# Patient Record
Sex: FEMALE | Race: WHITE | NOT HISPANIC OR LATINO | Employment: FULL TIME | URBAN - METROPOLITAN AREA
[De-identification: names, ages, dates, MRNs, and addresses within clinical notes are randomized per-mention and may not be internally consistent; named-entity substitution may affect disease eponyms.]

---

## 2017-02-06 ENCOUNTER — OFFICE VISIT (OUTPATIENT)
Dept: URGENT CARE | Facility: URGENT CARE | Age: 22
End: 2017-02-06
Payer: COMMERCIAL

## 2017-02-06 VITALS
HEIGHT: 64 IN | BODY MASS INDEX: 19.63 KG/M2 | WEIGHT: 115 LBS | HEART RATE: 84 BPM | OXYGEN SATURATION: 99 % | SYSTOLIC BLOOD PRESSURE: 143 MMHG | DIASTOLIC BLOOD PRESSURE: 88 MMHG | TEMPERATURE: 98 F

## 2017-02-06 DIAGNOSIS — J45.21 MILD INTERMITTENT ASTHMA WITH EXACERBATION: Primary | ICD-10-CM

## 2017-02-06 PROCEDURE — 99214 OFFICE O/P EST MOD 30 MIN: CPT | Mod: 25 | Performed by: INTERNAL MEDICINE

## 2017-02-06 PROCEDURE — 94640 AIRWAY INHALATION TREATMENT: CPT | Performed by: INTERNAL MEDICINE

## 2017-02-06 RX ORDER — ALBUTEROL SULFATE 0.83 MG/ML
1 SOLUTION RESPIRATORY (INHALATION) ONCE
Qty: 1 VIAL | Refills: 0
Start: 2017-02-06 | End: 2017-02-06

## 2017-02-06 RX ORDER — PREDNISONE 20 MG/1
40 TABLET ORAL DAILY
Qty: 10 TABLET | Refills: 0 | Status: SHIPPED | OUTPATIENT
Start: 2017-02-06 | End: 2017-02-11

## 2017-02-06 NOTE — MR AVS SNAPSHOT
"              After Visit Summary   2/6/2017    Myah Blumberg    MRN: [id number]           Patient Information     Date Of Birth          1995        Visit Information        Provider Department      2/6/2017 7:45 PM Charisse Nettles MD [facility]        Today's Diagnoses     Mild intermittent asthma with exacerbation    -  1       Care Instructions    Alb inhaler 2 puffs every 4 hours as needed  Prednisone 5 day burst.    Fluids  Rest    Recheck 1 week for lung recheck    Call or return to clinic if symptoms worsen or fail to improve as anticipated.          Follow-ups after your visit        Who to contact     If you have questions or need follow up information about today's clinic visit or your schedule please contact [facility] directly at [phone].  Normal or non-critical lab and imaging results will be communicated to you by MyChart, letter or phone within 4 business days after the clinic has received the results. If you do not hear from us within 7 days, please contact the clinic through MyChart or phone. If you have a critical or abnormal lab result, we will notify you by phone as soon as possible.  Submit refill requests through [employer] or call your pharmacy and they will forward the refill request to us. Please allow 3 business days for your refill to be completed.          Additional Information About Your Visit        MyChart Information     [employer] lets you send messages to your doctor, view your test results, renew your prescriptions, schedule appointments and more. To sign up, go to [email or web address] . Click on \"Log in\" on the left side of the screen, which will take you to the Welcome page. Then click on \"Sign up Now\" on the right side of the page.     You will be asked to enter the access code listed below, as well as some personal information. Please follow the directions to create your username and password.     Your access code " "is: [email or web address]  Expires: 2017  9:03 PM     Your access code will  in 90 days. If you need help or a new code, please call your [place] clinic or [phone].        Care EveryWhere ID     This is your Care EveryWhere ID. This could be used by other organizations to access your [place] medical records  [id number]        Your Vitals Were     Pulse Temperature Height BMI (Body Mass Index) Pulse Oximetry Last Period    84 98  F (36.7  C) (Tympanic) 5' 4\" (1.626 m) 19.73 kg/m2 99% 2017       Blood Pressure from Last 3 Encounters:   17 143/88   16 124/80   10/21/16 125/94    Weight from Last 3 Encounters:   17 115 lb (52.164 kg)   16 117 lb 9.6 oz (53.343 kg) (29.23 %*)   10/21/16 115 lb (52.164 kg) (24.18 %*)     * Growth percentiles are based on [facility] 2-20 Years data.              We Performed the Following     INHALATION/NEBULIZER TREATMENT, INITIAL          Today's Medication Changes          These changes are accurate as of: 17  9:03 PM.  If you have any questions, ask your nurse or doctor.               Start taking these medicines.        Dose/Directions    predniSONE 20 MG tablet   Commonly known as:  DELTASONE   Used for:  Mild intermittent asthma with exacerbation   Started by:  Charisse Nettles MD        Dose:  40 mg   Take 2 tablets (40 mg) by mouth daily for 5 days   Quantity:  10 tablet   Refills:  0         These medicines have changed or have updated prescriptions.        Dose/Directions    * albuterol (2.5 MG/3ML) 0.083% neb solution   This may have changed:  Another medication with the same name was added. Make sure you understand how and when to take each.   Changed by:  Portia Ozuna APRN CNP        Dose:  1 vial   Take 1 vial by nebulization every 6 hours as needed for shortness of breath / dyspnea or wheezing   Refills:  0       * albuterol (2.5 MG/3ML) 0.083% neb solution   This may have changed:  You were already taking a medication " with the same name, and this prescription was added. Make sure you understand how and when to take each.   Used for:  Mild intermittent asthma with exacerbation   Changed by:  Charisse Nettles MD        Dose:  1 vial   Take 1 vial (2.5 mg) by nebulization once for 1 dose   Quantity:  1 vial   Refills:  0       * Notice:  This list has 2 medication(s) that are the same as other medications prescribed for you. Read the directions carefully, and ask your doctor or other care provider to review them with you.         Where to get your medicines      Some of these will need a paper prescription and others can be bought over the counter.  Ask your nurse if you have questions.     Bring a paper prescription for each of these medications    - predniSONE 20 MG tablet    You don't need a prescription for these medications    - albuterol (2.5 MG/3ML) 0.083% neb solution             Primary Care Provider Office Phone # Fax #    [facility] [phone]       [address] [place] [id number]        Thank you!     Thank you for choosing [facility]  for your care. Our goal is always to provide you with excellent care. Hearing back from our patients is one way we can continue to improve our services. Please take a few minutes to complete the written survey that you may receive in the mail after your visit with us. Thank you!             Your Updated Medication List - Protect others around you: Learn how to safely use, store and throw away your medicines at www.disposemymeds.org.          This list is accurate as of: 2/6/17  9:03 PM.  Always use your most recent med list.                   Brand Name Dispense Instructions for use    * albuterol (2.5 MG/3ML) 0.083% neb solution      Take 1 vial by nebulization every 6 hours as needed for shortness of breath / dyspnea or wheezing       * albuterol (2.5 MG/3ML) 0.083% neb solution     1 vial    Take 1 vial (2.5 mg) by  nebulization once for 1 dose       drospirenone-ethinyl estradiol 3-0.02 MG per tablet    LUZ     Take 1 tablet by mouth daily       HYDROcodone-acetaminophen 5-325 MG per tablet    NORCO    15 tablet    Take 1-2 tablets by mouth every 4 hours as needed for moderate to severe pain       predniSONE 20 MG tablet    DELTASONE    10 tablet    Take 2 tablets (40 mg) by mouth daily for 5 days       PROBIOTIC PO          * Notice:  This list has 2 medication(s) that are the same as other medications prescribed for you. Read the directions carefully, and ask your doctor or other care provider to review them with you.

## 2017-02-07 NOTE — PATIENT INSTRUCTIONS
Alb inhaler 2 puffs every 4 hours as needed  Prednisone 5 day burst.    Fluids  Rest    Recheck 1 week for lung recheck    Call or return to clinic if symptoms worsen or fail to improve as anticipated.

## 2017-02-07 NOTE — PROGRESS NOTES
"SUBJECTIVE:   Myah Blumberg is a 22 year old female presenting with a chief complaint  Chief Complaint   Patient presents with     Urgent Care     Pt in clinic c/o fever, cough, congestion, fatigue, sweats, chills, and headaches for 5 days.     Respiratory Problems     Fever     Fatigue     .  Onset of symptoms was 1 week(s) ago.  Treatment measures tried include albuterol inhaler.  Predisposing factors include HX of asthma.  Had flu shot    Past Medical History   Diagnosis Date     Uncomplicated asthma      exercise induced     Current Outpatient Prescriptions   Medication Sig Dispense Refill     drospirenone-ethinyl estradiol (LUZ) 3-0.02 MG per tablet Take 1 tablet by mouth daily       albuterol (2.5 MG/3ML) 0.083% nebulizer solution Take 1 vial by nebulization every 6 hours as needed for shortness of breath / dyspnea or wheezing       Probiotic Product (PROBIOTIC PO)        HYDROcodone-acetaminophen (NORCO) 5-325 MG per tablet Take 1-2 tablets by mouth every 4 hours as needed for moderate to severe pain 15 tablet 0     Social History   Substance Use Topics     Smoking status: Never Smoker      Smokeless tobacco: Not on file     Alcohol Use: Yes      Comment: twice per week; 4-5 drinks per occasional     OBJECTIVE  :/88 mmHg  Pulse 84  Temp(Src) 98  F (36.7  C) (Tympanic)  Ht 5' 4\" (1.626 m)  Wt 115 lb (52.164 kg)  BMI 19.73 kg/m2  SpO2 99%  LMP 01/01/2017  GENERAL APPEARANCE: healthy, alert and no distress  HENT: ear canals and TM's normal.  Nose and mouth without ulcers, erythema or lesions  RESP: expiratory wheezes throughout  CV: regular rates and rhythm, normal S1 S2, no murmur noted    Neb - improved aeration and decreased wheezing    ASSESSMENT:  (J45.21) Mild intermittent asthma with exacerbation  (primary encounter diagnosis)  Comment:   Plan: albuterol (2.5 MG/3ML) 0.083% neb solution,         INHALATION/NEBULIZER TREATMENT, INITIAL,         predniSONE (DELTASONE) 20 MG tablet           "     Patient Instructions   Alb inhaler 2 puffs every 4 hours as needed  Prednisone 5 day burst.    Fluids  Rest    Call or return to clinic if symptoms worsen or fail to improve as anticipated.

## 2017-02-07 NOTE — NURSING NOTE
"Chief Complaint   Patient presents with     Urgent Care     Pt in clinic c/o fever, cough, congestion, fatigue, sweats, chills, and headaches for 5 days.     Respiratory Problems     Fever     Fatigue       Initial /88 mmHg  Pulse 84  Temp(Src) 98  F (36.7  C) (Tympanic)  Ht 5' 4\" (1.626 m)  Wt 115 lb (52.164 kg)  BMI 19.73 kg/m2  SpO2 99%  LMP 01/01/2017 Estimated body mass index is 19.73 kg/(m^2) as calculated from the following:    Height as of this encounter: 5' 4\" (1.626 m).    Weight as of this encounter: 115 lb (52.164 kg).  Medication Reconciliation: complete   Kamila Lawrence/ MA    "

## 2018-01-30 ENCOUNTER — NURSE TRIAGE (OUTPATIENT)
Dept: NURSING | Facility: CLINIC | Age: 23
End: 2018-01-30

## 2018-01-30 NOTE — TELEPHONE ENCOUNTER
Reason for Disposition    [1] SEVERE throat pain (interferes with function) AND [2] not improved after 2 hours of ibuprofen AND [3] drinking adequately    Protocols used: SORE THROAT-PEDIATRIC-

## 2018-01-30 NOTE — TELEPHONE ENCOUNTER
"Tracy called reporting that she has the chills, sore throat (pain rated at a \"5\"), diarrhea, feels like she is getting a fever but temp has been normal. Symptoms started yesterday but she is drinking and eating adequately.   "

## 2024-03-14 ENCOUNTER — HOSPITAL ENCOUNTER (EMERGENCY)
Facility: CLINIC | Age: 29
Discharge: HOME | End: 2024-03-15
Attending: EMERGENCY MEDICINE | Admitting: EMERGENCY MEDICINE
Payer: COMMERCIAL

## 2024-03-14 DIAGNOSIS — K29.01 ACUTE GASTRITIS WITH HEMORRHAGE, UNSPECIFIED GASTRITIS TYPE: ICD-10-CM

## 2024-03-14 PROCEDURE — 99284 EMERGENCY DEPT VISIT MOD MDM: CPT | Mod: 25 | Performed by: EMERGENCY MEDICINE

## 2024-03-14 PROCEDURE — 96374 THER/PROPH/DIAG INJ IV PUSH: CPT | Performed by: EMERGENCY MEDICINE

## 2024-03-14 PROCEDURE — 99284 EMERGENCY DEPT VISIT MOD MDM: CPT | Performed by: EMERGENCY MEDICINE

## 2024-03-14 ASSESSMENT — COLUMBIA-SUICIDE SEVERITY RATING SCALE - C-SSRS
1. IN THE PAST MONTH, HAVE YOU WISHED YOU WERE DEAD OR WISHED YOU COULD GO TO SLEEP AND NOT WAKE UP?: NO
2. HAVE YOU ACTUALLY HAD ANY THOUGHTS OF KILLING YOURSELF IN THE PAST MONTH?: NO
6. HAVE YOU EVER DONE ANYTHING, STARTED TO DO ANYTHING, OR PREPARED TO DO ANYTHING TO END YOUR LIFE?: NO

## 2024-03-15 VITALS
OXYGEN SATURATION: 95 % | TEMPERATURE: 98.1 F | DIASTOLIC BLOOD PRESSURE: 91 MMHG | SYSTOLIC BLOOD PRESSURE: 122 MMHG | HEART RATE: 77 BPM | RESPIRATION RATE: 13 BRPM

## 2024-03-15 LAB
ABO/RH(D): NORMAL
ALBUMIN SERPL BCG-MCNC: 4.3 G/DL (ref 3.5–5.2)
ALP SERPL-CCNC: 43 U/L (ref 40–150)
ALT SERPL W P-5'-P-CCNC: <5 U/L (ref 0–50)
ANION GAP SERPL CALCULATED.3IONS-SCNC: 11 MMOL/L (ref 7–15)
ANTIBODY SCREEN: NEGATIVE
AST SERPL W P-5'-P-CCNC: 19 U/L (ref 0–45)
BASOPHILS # BLD AUTO: 0 10E3/UL (ref 0–0.2)
BASOPHILS NFR BLD AUTO: 1 %
BILIRUB SERPL-MCNC: 0.7 MG/DL
BUN SERPL-MCNC: 11.2 MG/DL (ref 6–20)
CALCIUM SERPL-MCNC: 9.5 MG/DL (ref 8.6–10)
CHLORIDE SERPL-SCNC: 103 MMOL/L (ref 98–107)
CREAT SERPL-MCNC: 0.82 MG/DL (ref 0.51–0.95)
DEPRECATED HCO3 PLAS-SCNC: 26 MMOL/L (ref 22–29)
EGFRCR SERPLBLD CKD-EPI 2021: >90 ML/MIN/1.73M2
EOSINOPHIL # BLD AUTO: 0.7 10E3/UL (ref 0–0.7)
EOSINOPHIL NFR BLD AUTO: 10 %
ERYTHROCYTE [DISTWIDTH] IN BLOOD BY AUTOMATED COUNT: 12.9 % (ref 10–15)
GLUCOSE SERPL-MCNC: 120 MG/DL (ref 70–99)
HCG SERPL QL: NEGATIVE
HCT VFR BLD AUTO: 42.2 % (ref 35–47)
HGB BLD-MCNC: 13.9 G/DL (ref 11.7–15.7)
IMM GRANULOCYTES # BLD: 0 10E3/UL
IMM GRANULOCYTES NFR BLD: 0 %
INR PPP: 1.11 (ref 0.85–1.15)
LYMPHOCYTES # BLD AUTO: 2.5 10E3/UL (ref 0.8–5.3)
LYMPHOCYTES NFR BLD AUTO: 38 %
MCH RBC QN AUTO: 31.2 PG (ref 26.5–33)
MCHC RBC AUTO-ENTMCNC: 32.9 G/DL (ref 31.5–36.5)
MCV RBC AUTO: 95 FL (ref 78–100)
MONOCYTES # BLD AUTO: 0.5 10E3/UL (ref 0–1.3)
MONOCYTES NFR BLD AUTO: 7 %
NEUTROPHILS # BLD AUTO: 2.9 10E3/UL (ref 1.6–8.3)
NEUTROPHILS NFR BLD AUTO: 44 %
NRBC # BLD AUTO: 0 10E3/UL
NRBC BLD AUTO-RTO: 0 /100
PLATELET # BLD AUTO: 175 10E3/UL (ref 150–450)
POTASSIUM SERPL-SCNC: 3.6 MMOL/L (ref 3.4–5.3)
PROT SERPL-MCNC: 7.1 G/DL (ref 6.4–8.3)
RBC # BLD AUTO: 4.45 10E6/UL (ref 3.8–5.2)
SODIUM SERPL-SCNC: 140 MMOL/L (ref 135–145)
SPECIMEN EXPIRATION DATE: NORMAL
WBC # BLD AUTO: 6.7 10E3/UL (ref 4–11)

## 2024-03-15 PROCEDURE — 250N000011 HC RX IP 250 OP 636: Performed by: EMERGENCY MEDICINE

## 2024-03-15 PROCEDURE — 85025 COMPLETE CBC W/AUTO DIFF WBC: CPT | Performed by: EMERGENCY MEDICINE

## 2024-03-15 PROCEDURE — 36415 COLL VENOUS BLD VENIPUNCTURE: CPT | Performed by: EMERGENCY MEDICINE

## 2024-03-15 PROCEDURE — 86900 BLOOD TYPING SEROLOGIC ABO: CPT | Performed by: EMERGENCY MEDICINE

## 2024-03-15 PROCEDURE — 85610 PROTHROMBIN TIME: CPT | Performed by: EMERGENCY MEDICINE

## 2024-03-15 PROCEDURE — 80053 COMPREHEN METABOLIC PANEL: CPT | Performed by: EMERGENCY MEDICINE

## 2024-03-15 PROCEDURE — C9113 INJ PANTOPRAZOLE SODIUM, VIA: HCPCS | Performed by: EMERGENCY MEDICINE

## 2024-03-15 PROCEDURE — 84703 CHORIONIC GONADOTROPIN ASSAY: CPT | Performed by: EMERGENCY MEDICINE

## 2024-03-15 RX ADMIN — PANTOPRAZOLE SODIUM 40 MG: 40 INJECTION, POWDER, FOR SOLUTION INTRAVENOUS at 00:26

## 2024-03-15 NOTE — DISCHARGE INSTRUCTIONS
Follow-up with your primary care provider as planned    Return to the emergency department if you develop black stools, other episodes of blood in your vomit, dizziness, shortness of breath or if you have any further concerns    You can try over-the-counter medications such as Pepcid or pantoprazole for your symptoms

## 2024-03-15 NOTE — ED TRIAGE NOTES
Pt has been having acid reflux the last couple months, started taking a prebiotic and probiotic and has helped. Tonight after eating had bad stomach pain and threw up blood after the dinner.       Pt took HCL enzymes which led to inflammation, MD told to take baking soda before eating dinner.

## 2024-03-15 NOTE — ED PROVIDER NOTES
[place] EMERGENCY DEPARTMENT ([facility])    3/14/24       ED PROVIDER NOTE   Vertical Triage A 12:04 AM   History     Chief Complaint   Patient presents with    Hematemesis     The history is provided by the patient and medical records.     Myah Elise Blumberg is a 29 year old female who presents to the Emergency Department with waxing waning abdominal pain for past few months along with first episode of nausea and vomiting today with some streaks of blood in her emesis. Patient states that symptoms started with GERD-like symptoms and upper abdominal pain several months ago.  She states that she was told to take HCl enzymes for this, states that it seemed to make her abdominal pain even worse.  Her abdominal pain has been waxing and waning since then. Today her epigastric abdominal pain so bad she vomited her entire dinner, followed by another wave of emesis consisting of phlegm and mucus as well as a small amount of streaks of blood at the end. No black or red stools. N She denies eating any red foods; ate turkey sandwich and chips, and a chicken eveline and fries for dinner. No tomatoes. No fevers.  She does drink alcohol at times, but hasn't drank since last week. Is not a heavy drinker. No history of liver disease. Not on anticoagulation. No dizziness, chest pain, shortness of breath, lightheadedness, syncope or near syncope. When her stomach hurts she feels a little short of breath with burping, bloating, gassiness. Has had this for a while, at times it gets pretty bad where she has to lay down.  She denies any issues with vomiting or diarrhea other than the episode of vomiting today. No history of endoscopy.     Past Medical History  Past Medical History:   Diagnosis Date    Uncomplicated asthma     exercise induced     Past Surgical History:   Procedure Laterality Date    HC TOOTH EXTRACTION W/FORCEP  2015    [place] tooth extraction    TONSILLECTOMY  2015     albuterol (2.5 MG/3ML) 0.083%  nebulizer solution  drospirenone-ethinyl estradiol (LUZ) 3-0.02 MG per tablet  HYDROcodone-acetaminophen (NORCO) 5-325 MG per tablet  Probiotic Product (PROBIOTIC PO)      Allergies   Allergen Reactions    Penicillin G Swelling     Family History  Family History   Problem Relation Age of Onset    Asthma Mother     Substance Abuse Father     Gout Father     Depression Maternal Grandmother     Attention Deficit Disorder Paternal Half-Sister      Social History   Social History     Tobacco Use    Smoking status: Never   Substance Use Topics    Alcohol use: Yes     Comment: twice per week; 4-5 drinks per occasional    Drug use: Yes     Types: Marijuana         A medically appropriate review of systems was performed with pertinent positives and negatives noted in the HPI, and all other systems negative.    Physical Exam   BP: (!) 163/118  Pulse: 79  Temp: 98.1  F (36.7  C)  SpO2: 99 %  Physical Exam  Physical Exam   Constitutional: oriented to person, place, and time. appears well-developed and well-nourished.   HENT:   Head: Normocephalic and atraumatic.   Neck: Normal range of motion.   Pulmonary/Chest: Effort normal. No respiratory distress.   Cardiac: No murmurs, rubs, gallops. RRR.  Abdominal: Abdomen soft, nontender, nondistended. No rebound tenderness.  MSK: Long bones without deformity or evidence of trauma  Neurological: alert and oriented to person, place, and time.   Skin: Skin is warm and dry.   Psychiatric:  normal mood and affect.  behavior is normal. Thought content normal.      ED Course, Procedures, & Data      Procedures       No results found for any visits on 03/14/24.  Medications - No data to display  Labs Ordered and Resulted from Time of ED Arrival to Time of ED Departure - No data to display  No orders to display          Critical care was not performed.     Medical Decision Making  The patient's presentation was of high complexity (an acute health issue posing potential threat to life or bodily  function).    The patient's evaluation involved:  ordering and/or review of 3+ test(s) in this encounter (see separate area of note for details)    The patient's management necessitated only low risk treatment.    Assessment & Plan    MDM  Patient presenting with small episode of hematemesis at the end of vomiting.  She has had quite a few months of similar symptoms but has never had vomiting.  It sounds like a small streak and some mucus.  Seems to be more likely related to a Maggie-Cardoso tear than a bleeding ulcer or other massive upper GI bleed.  She is not having any lower symptoms.  She has a Glascow Blatchford score of 0.  I do feel she is overall very low risk for decompensation.  Very unlikely needs an emergent endoscopy or admission at this point.  I discussed return precautions and I suggested some medication she can try over-the-counter.  She has a follow-up under week with her primary care provider.  Discussed that she may need an endoscopy in the near future.    I have reviewed the nursing notes. I have reviewed the findings, diagnosis, plan and need for follow up with the patient.    New Prescriptions    No medications on file       Final diagnoses:   Acute gastritis with hemorrhage, unspecified gastritis type       Mark Moon  [facility] EMERGENCY DEPARTMENT  3/14/2024     Mark Moon MD  03/15/24 0100